# Patient Record
Sex: FEMALE | Race: WHITE | URBAN - METROPOLITAN AREA
[De-identification: names, ages, dates, MRNs, and addresses within clinical notes are randomized per-mention and may not be internally consistent; named-entity substitution may affect disease eponyms.]

---

## 2017-02-21 ENCOUNTER — ANESTHESIA EVENT (OUTPATIENT)
Dept: SURGERY | Facility: CLINIC | Age: 82
End: 2017-02-21
Payer: MEDICARE

## 2017-02-21 ENCOUNTER — HOSPITAL ENCOUNTER (OUTPATIENT)
Facility: CLINIC | Age: 82
Discharge: HOME | End: 2017-02-21
Attending: OTOLARYNGOLOGY | Admitting: OTOLARYNGOLOGY
Payer: MEDICARE

## 2017-02-21 ENCOUNTER — ANESTHESIA (OUTPATIENT)
Dept: SURGERY | Facility: CLINIC | Age: 82
End: 2017-02-21
Payer: MEDICARE

## 2017-02-21 VITALS
OXYGEN SATURATION: 94 % | HEIGHT: 55 IN | SYSTOLIC BLOOD PRESSURE: 147 MMHG | TEMPERATURE: 97.9 F | BODY MASS INDEX: 24.3 KG/M2 | RESPIRATION RATE: 12 BRPM | DIASTOLIC BLOOD PRESSURE: 92 MMHG | WEIGHT: 105 LBS

## 2017-02-21 DIAGNOSIS — G89.18 POST-OP PAIN: Primary | ICD-10-CM

## 2017-02-21 PROCEDURE — 25000132 ZZH RX MED GY IP 250 OP 250 PS 637: Mod: GY | Performed by: OTOLARYNGOLOGY

## 2017-02-21 PROCEDURE — 25000125 ZZHC RX 250: Performed by: NURSE ANESTHETIST, CERTIFIED REGISTERED

## 2017-02-21 PROCEDURE — 71000027 ZZH RECOVERY PHASE 2 EACH 15 MINS: Performed by: OTOLARYNGOLOGY

## 2017-02-21 PROCEDURE — 36000063 ZZH SURGERY LEVEL 4 EA 15 ADDTL MIN: Performed by: OTOLARYNGOLOGY

## 2017-02-21 PROCEDURE — A9270 NON-COVERED ITEM OR SERVICE: HCPCS | Mod: GY | Performed by: OTOLARYNGOLOGY

## 2017-02-21 PROCEDURE — 25800025 ZZH RX 258: Performed by: ANESTHESIOLOGY

## 2017-02-21 PROCEDURE — 27210794 ZZH OR GENERAL SUPPLY STERILE: Performed by: OTOLARYNGOLOGY

## 2017-02-21 PROCEDURE — 25000566 ZZH SEVOFLURANE, EA 15 MIN: Performed by: OTOLARYNGOLOGY

## 2017-02-21 PROCEDURE — 25000128 H RX IP 250 OP 636: Performed by: OTOLARYNGOLOGY

## 2017-02-21 PROCEDURE — 37000008 ZZH ANESTHESIA TECHNICAL FEE, 1ST 30 MIN: Performed by: OTOLARYNGOLOGY

## 2017-02-21 PROCEDURE — 25000128 H RX IP 250 OP 636: Performed by: NURSE ANESTHETIST, CERTIFIED REGISTERED

## 2017-02-21 PROCEDURE — 25000125 ZZHC RX 250: Performed by: OTOLARYNGOLOGY

## 2017-02-21 PROCEDURE — 36000093 ZZH SURGERY LEVEL 4 1ST 30 MIN: Performed by: OTOLARYNGOLOGY

## 2017-02-21 PROCEDURE — 37000009 ZZH ANESTHESIA TECHNICAL FEE, EACH ADDTL 15 MIN: Performed by: OTOLARYNGOLOGY

## 2017-02-21 PROCEDURE — 71000012 ZZH RECOVERY PHASE 1 LEVEL 1 FIRST HR: Performed by: OTOLARYNGOLOGY

## 2017-02-21 PROCEDURE — 27210995 ZZH RX 272: Performed by: OTOLARYNGOLOGY

## 2017-02-21 PROCEDURE — 40000306 ZZH STATISTIC PRE PROC ASSESS II: Performed by: OTOLARYNGOLOGY

## 2017-02-21 DEVICE — SHEET SILICONE 38MMX51MMX0.13MM  20-10685: Type: IMPLANTABLE DEVICE | Site: EAR | Status: FUNCTIONAL

## 2017-02-21 RX ORDER — EPINEPHRINE NASAL SOLUTION 1 MG/ML
SOLUTION NASAL PRN
Status: DISCONTINUED | OUTPATIENT
Start: 2017-02-21 | End: 2017-02-21 | Stop reason: HOSPADM

## 2017-02-21 RX ORDER — SODIUM CHLORIDE, SODIUM LACTATE, POTASSIUM CHLORIDE, CALCIUM CHLORIDE 600; 310; 30; 20 MG/100ML; MG/100ML; MG/100ML; MG/100ML
INJECTION, SOLUTION INTRAVENOUS CONTINUOUS
Status: DISCONTINUED | OUTPATIENT
Start: 2017-02-21 | End: 2017-02-21 | Stop reason: HOSPADM

## 2017-02-21 RX ORDER — GLYCOPYRROLATE 0.2 MG/ML
INJECTION, SOLUTION INTRAMUSCULAR; INTRAVENOUS PRN
Status: DISCONTINUED | OUTPATIENT
Start: 2017-02-21 | End: 2017-02-21

## 2017-02-21 RX ORDER — DEXAMETHASONE SODIUM PHOSPHATE 4 MG/ML
INJECTION, SOLUTION INTRA-ARTICULAR; INTRALESIONAL; INTRAMUSCULAR; INTRAVENOUS; SOFT TISSUE PRN
Status: DISCONTINUED | OUTPATIENT
Start: 2017-02-21 | End: 2017-02-21

## 2017-02-21 RX ORDER — CEFAZOLIN SODIUM 1 G/3ML
1 INJECTION, POWDER, FOR SOLUTION INTRAMUSCULAR; INTRAVENOUS SEE ADMIN INSTRUCTIONS
Status: DISCONTINUED | OUTPATIENT
Start: 2017-02-21 | End: 2017-02-21 | Stop reason: HOSPADM

## 2017-02-21 RX ORDER — ONDANSETRON 2 MG/ML
4 INJECTION INTRAMUSCULAR; INTRAVENOUS EVERY 30 MIN PRN
Status: DISCONTINUED | OUTPATIENT
Start: 2017-02-21 | End: 2017-02-21 | Stop reason: HOSPADM

## 2017-02-21 RX ORDER — MAGNESIUM HYDROXIDE 1200 MG/15ML
LIQUID ORAL PRN
Status: DISCONTINUED | OUTPATIENT
Start: 2017-02-21 | End: 2017-02-21 | Stop reason: HOSPADM

## 2017-02-21 RX ORDER — FENTANYL CITRATE 50 UG/ML
25-50 INJECTION, SOLUTION INTRAMUSCULAR; INTRAVENOUS
Status: DISCONTINUED | OUTPATIENT
Start: 2017-02-21 | End: 2017-02-21 | Stop reason: HOSPADM

## 2017-02-21 RX ORDER — HYDROCODONE BITARTRATE AND ACETAMINOPHEN 5; 325 MG/1; MG/1
1 TABLET ORAL ONCE
Status: COMPLETED | OUTPATIENT
Start: 2017-02-21 | End: 2017-02-21

## 2017-02-21 RX ORDER — ONDANSETRON 4 MG/1
4 TABLET, ORALLY DISINTEGRATING ORAL EVERY 30 MIN PRN
Status: DISCONTINUED | OUTPATIENT
Start: 2017-02-21 | End: 2017-02-21 | Stop reason: HOSPADM

## 2017-02-21 RX ORDER — CEFAZOLIN SODIUM 2 G/100ML
2 INJECTION, SOLUTION INTRAVENOUS
Status: COMPLETED | OUTPATIENT
Start: 2017-02-21 | End: 2017-02-21

## 2017-02-21 RX ORDER — LIDOCAINE 40 MG/G
CREAM TOPICAL
Status: DISCONTINUED | OUTPATIENT
Start: 2017-02-21 | End: 2017-02-21 | Stop reason: HOSPADM

## 2017-02-21 RX ORDER — DEXAMETHASONE SODIUM PHOSPHATE 10 MG/ML
8 INJECTION, SOLUTION INTRAMUSCULAR; INTRAVENOUS ONCE
Status: DISCONTINUED | OUTPATIENT
Start: 2017-02-21 | End: 2017-02-21 | Stop reason: HOSPADM

## 2017-02-21 RX ORDER — LABETALOL HYDROCHLORIDE 5 MG/ML
10 INJECTION, SOLUTION INTRAVENOUS
Status: DISCONTINUED | OUTPATIENT
Start: 2017-02-21 | End: 2017-02-21 | Stop reason: HOSPADM

## 2017-02-21 RX ORDER — FENTANYL CITRATE 50 UG/ML
INJECTION, SOLUTION INTRAMUSCULAR; INTRAVENOUS PRN
Status: DISCONTINUED | OUTPATIENT
Start: 2017-02-21 | End: 2017-02-21

## 2017-02-21 RX ORDER — HYDRALAZINE HYDROCHLORIDE 20 MG/ML
2.5-5 INJECTION INTRAMUSCULAR; INTRAVENOUS EVERY 10 MIN PRN
Status: DISCONTINUED | OUTPATIENT
Start: 2017-02-21 | End: 2017-02-21 | Stop reason: HOSPADM

## 2017-02-21 RX ORDER — ACETAMINOPHEN 325 MG/1
650 TABLET ORAL
Status: DISCONTINUED | OUTPATIENT
Start: 2017-02-21 | End: 2017-02-21 | Stop reason: HOSPADM

## 2017-02-21 RX ORDER — MUPIROCIN CALCIUM 20 MG/G
CREAM TOPICAL PRN
Status: DISCONTINUED | OUTPATIENT
Start: 2017-02-21 | End: 2017-02-21 | Stop reason: HOSPADM

## 2017-02-21 RX ORDER — PROPOFOL 10 MG/ML
INJECTION, EMULSION INTRAVENOUS PRN
Status: DISCONTINUED | OUTPATIENT
Start: 2017-02-21 | End: 2017-02-21

## 2017-02-21 RX ORDER — OFLOXACIN 3 MG/ML
SOLUTION AURICULAR (OTIC) PRN
Status: DISCONTINUED | OUTPATIENT
Start: 2017-02-21 | End: 2017-02-21 | Stop reason: HOSPADM

## 2017-02-21 RX ORDER — MEPERIDINE HYDROCHLORIDE 25 MG/ML
12.5 INJECTION INTRAMUSCULAR; INTRAVENOUS; SUBCUTANEOUS
Status: DISCONTINUED | OUTPATIENT
Start: 2017-02-21 | End: 2017-02-21 | Stop reason: HOSPADM

## 2017-02-21 RX ORDER — LIDOCAINE HYDROCHLORIDE 10 MG/ML
INJECTION, SOLUTION INFILTRATION; PERINEURAL PRN
Status: DISCONTINUED | OUTPATIENT
Start: 2017-02-21 | End: 2017-02-21

## 2017-02-21 RX ORDER — HYDROMORPHONE HYDROCHLORIDE 1 MG/ML
.3-.5 INJECTION, SOLUTION INTRAMUSCULAR; INTRAVENOUS; SUBCUTANEOUS EVERY 10 MIN PRN
Status: DISCONTINUED | OUTPATIENT
Start: 2017-02-21 | End: 2017-02-21 | Stop reason: HOSPADM

## 2017-02-21 RX ORDER — ONDANSETRON 2 MG/ML
INJECTION INTRAMUSCULAR; INTRAVENOUS PRN
Status: DISCONTINUED | OUTPATIENT
Start: 2017-02-21 | End: 2017-02-21

## 2017-02-21 RX ORDER — NALOXONE HYDROCHLORIDE 0.4 MG/ML
.1-.4 INJECTION, SOLUTION INTRAMUSCULAR; INTRAVENOUS; SUBCUTANEOUS
Status: DISCONTINUED | OUTPATIENT
Start: 2017-02-21 | End: 2017-02-21 | Stop reason: HOSPADM

## 2017-02-21 RX ORDER — HYDROCODONE BITARTRATE AND ACETAMINOPHEN 5; 325 MG/1; MG/1
1-2 TABLET ORAL
Status: DISCONTINUED | OUTPATIENT
Start: 2017-02-21 | End: 2017-02-21 | Stop reason: HOSPADM

## 2017-02-21 RX ORDER — HYDROCODONE BITARTRATE AND ACETAMINOPHEN 5; 325 MG/1; MG/1
1 TABLET ORAL EVERY 4 HOURS PRN
Qty: 20 TABLET | Refills: 0 | Status: SHIPPED | OUTPATIENT
Start: 2017-02-21

## 2017-02-21 RX ADMIN — PHENYLEPHRINE HYDROCHLORIDE 50 MCG: 10 INJECTION, SOLUTION INTRAMUSCULAR; INTRAVENOUS; SUBCUTANEOUS at 10:27

## 2017-02-21 RX ADMIN — PHENYLEPHRINE HYDROCHLORIDE 75 MCG: 10 INJECTION, SOLUTION INTRAMUSCULAR; INTRAVENOUS; SUBCUTANEOUS at 08:27

## 2017-02-21 RX ADMIN — FENTANYL CITRATE 50 MCG: 50 INJECTION, SOLUTION INTRAMUSCULAR; INTRAVENOUS at 08:50

## 2017-02-21 RX ADMIN — PHENYLEPHRINE HYDROCHLORIDE 50 MCG: 10 INJECTION, SOLUTION INTRAMUSCULAR; INTRAVENOUS; SUBCUTANEOUS at 09:04

## 2017-02-21 RX ADMIN — FENTANYL CITRATE 50 MCG: 50 INJECTION, SOLUTION INTRAMUSCULAR; INTRAVENOUS at 07:52

## 2017-02-21 RX ADMIN — CEFAZOLIN SODIUM 2 G: 2 INJECTION, SOLUTION INTRAVENOUS at 07:33

## 2017-02-21 RX ADMIN — PHENYLEPHRINE HYDROCHLORIDE 50 MCG: 10 INJECTION, SOLUTION INTRAMUSCULAR; INTRAVENOUS; SUBCUTANEOUS at 08:59

## 2017-02-21 RX ADMIN — SODIUM CHLORIDE, POTASSIUM CHLORIDE, SODIUM LACTATE AND CALCIUM CHLORIDE: 600; 310; 30; 20 INJECTION, SOLUTION INTRAVENOUS at 07:33

## 2017-02-21 RX ADMIN — PHENYLEPHRINE HYDROCHLORIDE 50 MCG: 10 INJECTION, SOLUTION INTRAMUSCULAR; INTRAVENOUS; SUBCUTANEOUS at 08:49

## 2017-02-21 RX ADMIN — PHENYLEPHRINE HYDROCHLORIDE 100 MCG: 10 INJECTION, SOLUTION INTRAMUSCULAR; INTRAVENOUS; SUBCUTANEOUS at 09:33

## 2017-02-21 RX ADMIN — PROPOFOL 100 MG: 10 INJECTION, EMULSION INTRAVENOUS at 07:41

## 2017-02-21 RX ADMIN — SODIUM CHLORIDE, POTASSIUM CHLORIDE, SODIUM LACTATE AND CALCIUM CHLORIDE: 600; 310; 30; 20 INJECTION, SOLUTION INTRAVENOUS at 09:37

## 2017-02-21 RX ADMIN — ONDANSETRON 4 MG: 2 INJECTION INTRAMUSCULAR; INTRAVENOUS at 09:30

## 2017-02-21 RX ADMIN — FENTANYL CITRATE 50 MCG: 50 INJECTION, SOLUTION INTRAMUSCULAR; INTRAVENOUS at 10:28

## 2017-02-21 RX ADMIN — PHENYLEPHRINE HYDROCHLORIDE 100 MCG: 10 INJECTION, SOLUTION INTRAMUSCULAR; INTRAVENOUS; SUBCUTANEOUS at 08:18

## 2017-02-21 RX ADMIN — GLYCOPYRROLATE 0.2 MG: 0.2 INJECTION, SOLUTION INTRAMUSCULAR; INTRAVENOUS at 07:41

## 2017-02-21 RX ADMIN — HYDROCODONE BITARTRATE AND ACETAMINOPHEN 1 TABLET: 5; 325 TABLET ORAL at 11:35

## 2017-02-21 RX ADMIN — PHENYLEPHRINE HYDROCHLORIDE 75 MCG: 10 INJECTION, SOLUTION INTRAMUSCULAR; INTRAVENOUS; SUBCUTANEOUS at 08:34

## 2017-02-21 RX ADMIN — FENTANYL CITRATE 50 MCG: 50 INJECTION, SOLUTION INTRAMUSCULAR; INTRAVENOUS at 07:41

## 2017-02-21 RX ADMIN — LIDOCAINE HYDROCHLORIDE 30 MG: 10 INJECTION, SOLUTION INFILTRATION; PERINEURAL at 07:41

## 2017-02-21 RX ADMIN — CEFAZOLIN 1 G: 1 INJECTION, POWDER, FOR SOLUTION INTRAMUSCULAR; INTRAVENOUS at 09:33

## 2017-02-21 RX ADMIN — DEXAMETHASONE SODIUM PHOSPHATE 4 MG: 4 INJECTION, SOLUTION INTRAMUSCULAR; INTRAVENOUS at 07:41

## 2017-02-21 RX ADMIN — PHENYLEPHRINE HYDROCHLORIDE 50 MCG: 10 INJECTION, SOLUTION INTRAMUSCULAR; INTRAVENOUS; SUBCUTANEOUS at 08:16

## 2017-02-21 RX ADMIN — ROCURONIUM BROMIDE 20 MG: 10 INJECTION INTRAVENOUS at 07:41

## 2017-02-21 ASSESSMENT — LIFESTYLE VARIABLES: TOBACCO_USE: 1

## 2017-02-21 ASSESSMENT — COPD QUESTIONNAIRES: COPD: 1

## 2017-02-21 NOTE — ANESTHESIA CARE TRANSFER NOTE
Patient: Pema Hill    Procedure(s):  Left Tympanoplasty with facial nerve monitoring, Ossicular reconstruction,  Right ear exam with debridement - Wound Class: II-Clean Contaminated   - Wound Class: II-Clean Contaminated   - Wound Class: I-Clean    Diagnosis: Tympanic Membrane Perfortion   Diagnosis Additional Information: No value filed.    Anesthesia Type:   General, ETT     Note:  Airway :Face Mask  Patient transferred to:PACU        Vitals: (Last set prior to Anesthesia Care Transfer)    CRNA VITALS  2/21/2017 1023 - 2/21/2017 1117      2/21/2017             NIBP: 93/52    Pulse: 78    NIBP Mean: 71    SpO2: 98 %    Resp Rate (observed): (!)  3                Electronically Signed By: HMEA Story CRNA  February 21, 2017  11:17 AM

## 2017-02-21 NOTE — DISCHARGE INSTRUCTIONS
EAR OPERATION DISCHARGE INSTRUCTIONS    Jorge Patino M.D.  Zhang Yates M.D.  David Ga M.D.  Venkata Law M.D.  Etelvina Bailey M.D.  Grover Stevens M.D.  [phone]    Ear surgery is very delicate work. To assure the best results possible, please follow these simple guidelines:  Precautions to be observed:  A. NO water in the operated ear.  1. You may wash hair 24 hours after surgery, but use an earplug.  2. To avoid water in your ear after the packing is removed, place cotton in the outer ear and Vaseline on the outer portion of the cotton.  3. NO swimming.  4. You will be notified when water precautions may be discontinued.    B. DO NOT  blow your nose for two weeks following surgery. Gentle sniffling is permitted after surgery; gentle blowing after two weeks.    C. If you must sneeze, do so with your mouth open.    D. Restrict activity.  1. NO heavy lifting, bending or strenuous activity until after the packing is removed.  2. NO contact sports for 6 weeks.  3. You may engage in light activity (walking, desk work, etc.) whenever you feel like it.   4. You may drive a car if you experience NO dizziness. Do not drive if you are taking narcotic pain medication.  5. DO NOT fly for 2 weeks.  6. If you have had a stapes operation, please avoid the following:  a. Scuba diving  b. High diving  c. Surfing  d. Wrestling and/or boxing  e. Hard nose blowing (gentle blowing permitted)        GENERAL ANESTHESIA OR SEDATION ADULT DISCHARGE INSTRUCTIONS   SPECIAL PRECAUTIONS FOR 24 HOURS AFTER SURGERY    IT IS NOT UNUSUAL TO FEEL LIGHT-HEADED OR FAINT, UP TO 24 HOURS AFTER SURGERY OR WHILE TAKING PAIN MEDICATION.  IF YOU HAVE THESE SYMPTOMS; SIT FOR A FEW MINUTES BEFORE STANDING AND HAVE SOMEONE ASSIST YOU WHEN YOU GET UP TO WALK OR USE THE BATHROOM.    YOU SHOULD REST AND RELAX FOR THE NEXT 24 HOURS AND YOU MUST MAKE ARRANGEMENTS TO HAVE SOMEONE STAY WITH YOU FOR AT LEAST 24 HOURS AFTER YOUR DISCHARGE.  AVOID HAZARDOUS  AND STRENUOUS ACTIVITIES.  DO NOT MAKE IMPORTANT DECISIONS FOR 24 HOURS.    DO NOT DRIVE ANY VEHICLE OR OPERATE MECHANICAL EQUIPMENT FOR 24 HOURS FOLLOWING THE END OF YOUR SURGERY.  EVEN THOUGH YOU MAY FEEL NORMAL, YOUR REACTIONS MAY BE AFFECTED BY THE MEDICATION YOU HAVE RECEIVED.    DO NOT DRINK ALCOHOLIC BEVERAGES FOR 24 HOURS FOLLOWING YOUR SURGERY.    DRINK CLEAR LIQUIDS (APPLE JUICE, GINGER ALE, 7-UP, BROTH, ETC.).  PROGRESS TO YOUR REGULAR DIET AS YOU FEEL ABLE.    YOU MAY HAVE A DRY MOUTH, A SORE THROAT, MUSCLES ACHES OR TROUBLE SLEEPING.  THESE SHOULD GO AWAY AFTER 24 HOURS.    CALL YOUR DOCTOR FOR ANY OF THE FOLLOWING:  SIGNS OF INFECTION (FEVER, GROWING TENDERNESS AT THE SURGERY SITE, A LARGE AMOUNT OF DRAINAGE OR BLEEDING, SEVERE PAIN, FOUL-SMELLING DRAINAGE, REDNESS OR SWELLING.    IT HAS BEEN OVER 8 TO 10 HOURS SINCE SURGERY AND YOU ARE STILL NOT ABLE TO URINATE (PASS WATER).

## 2017-02-21 NOTE — ANESTHESIA POSTPROCEDURE EVALUATION
Patient: Pema Hill    Procedure(s):  Left Tympanoplasty with facial nerve monitoring, Ossicular reconstruction,  Right ear exam with debridement - Wound Class: II-Clean Contaminated   - Wound Class: II-Clean Contaminated   - Wound Class: I-Clean    Diagnosis:Tympanic Membrane Perfortion   Diagnosis Additional Information: [facility] Brief Operative Note     Pre-operative diagnosis: Tympanic Membrane Perforation   Post-operative diagnosis Tympanic Membrane Perforation, Ossicular discontinuity  Procedure: Procedure(s):  Left Tympanoplasty with facial nerve monito, ring, Ossicular reconstruction, Right ear exam with debridement - Wound Class: II-Clean Contaminated  - Wound Class: II-Clean Contaminated  - Wound Class: I-Clean  Surgeon(s): Surgeon(s) and Role:  * Jorge Patino MD - Primary  Estimated blood l, oss: 10         Anesthesia Type:  General, ETT    Note:  Anesthesia Post Evaluation    Patient location during evaluation: PACU  Patient participation: Able to fully participate in evaluation  Level of consciousness: awake  Pain management: adequate  Airway patency: patent  Cardiovascular status: acceptable  Respiratory status: acceptable  Hydration status: acceptable  PONV: none     Anesthetic complications: None          Last vitals:  Vitals:    02/21/17 1107 02/21/17 1115 02/21/17 1130   BP:  (!) 104/92 107/63   Resp: 13 11 10   Temp:   97.5  F (36.4  C)   SpO2: 97% 96% 92%         Electronically Signed By: Pravin Cui MD  February 21, 2017  12:00 PM

## 2017-02-21 NOTE — IP AVS SNAPSHOT
MRN:[id number]                      After Visit Summary   2/21/2017    Pema Hill    MRN: [id number]           Thank you!     Thank you for choosing [facility] for your care. Our goal is always to provide you with excellent care. Hearing back from our patients is one way we can continue to improve our services. Please take a few minutes to complete the written survey that you may receive in the mail after you visit. If you would like to speak to someone directly about your visit please contact Patient Relations at [phone]. Thank you!          Patient Information     Date Of Birth          12/31/1934        About your hospital stay     You were admitted on:  February 21, 2017 You last received care in the:  [facility] PreOP/PostOP    You were discharged on:  February 21, 2017       Who to Call     For medical emergencies, please call 911.  For non-urgent questions about your medical care, please call your primary care provider or clinic, [phone]  For questions related to your surgery, please call your surgery clinic        Attending Provider     Provider Specialty    Jorge Patino MD Otolaryngology       Primary Care Provider Office Phone # Fax #    Ailyn Saul [phone]       [facility] [address]  [facility] [address]        After Care Instructions     Discharge Instructions       Patient to follow up with surgeon in one week.            No driving or operating machinery       until the day after procedure and off all narcotic pain medication            Notify Physician       If excessive bleeding                  Further instructions from your care team       EAR OPERATION DISCHARGE INSTRUCTIONS    Jorge Patino M.D.  Zhang Yates M.D.  David Ga M.D.  Venkata Law M.D.  Etelvina Bailey M.D.  Grover Stevens M.D.  [phone]    Ear surgery is very delicate work. To assure the best results possible, please follow these  simple guidelines:  Precautions to be observed:  A. NO water in the operated ear.  1. You may wash hair 24 hours after surgery, but use an earplug.  2. To avoid water in your ear after the packing is removed, place cotton in the outer ear and Vaseline on the outer portion of the cotton.  3. NO swimming.  4. You will be notified when water precautions may be discontinued.    B. DO NOT  blow your nose for two weeks following surgery. Gentle sniffling is permitted after surgery; gentle blowing after two weeks.    C. If you must sneeze, do so with your mouth open.    D. Restrict activity.  1. NO heavy lifting, bending or strenuous activity until after the packing is removed.  2. NO contact sports for 6 weeks.  3. You may engage in light activity (walking, desk work, etc.) whenever you feel like it.   4. You may drive a car if you experience NO dizziness. Do not drive if you are taking narcotic pain medication.  5. DO NOT fly for 2 weeks.  6. If you have had a stapes operation, please avoid the following:  a. Scuba diving  b. High diving  c. Surfing  d. Wrestling and/or boxing  e. Hard nose blowing (gentle blowing permitted)        GENERAL ANESTHESIA OR SEDATION ADULT DISCHARGE INSTRUCTIONS   SPECIAL PRECAUTIONS FOR 24 HOURS AFTER SURGERY    IT IS NOT UNUSUAL TO FEEL LIGHT-HEADED OR FAINT, UP TO 24 HOURS AFTER SURGERY OR WHILE TAKING PAIN MEDICATION.  IF YOU HAVE THESE SYMPTOMS; SIT FOR A FEW MINUTES BEFORE STANDING AND HAVE SOMEONE ASSIST YOU WHEN YOU GET UP TO WALK OR USE THE BATHROOM.    YOU SHOULD REST AND RELAX FOR THE NEXT 24 HOURS AND YOU MUST MAKE ARRANGEMENTS TO HAVE SOMEONE STAY WITH YOU FOR AT LEAST 24 HOURS AFTER YOUR DISCHARGE.  AVOID HAZARDOUS AND STRENUOUS ACTIVITIES.  DO NOT MAKE IMPORTANT DECISIONS FOR 24 HOURS.    DO NOT DRIVE ANY VEHICLE OR OPERATE MECHANICAL EQUIPMENT FOR 24 HOURS FOLLOWING THE END OF YOUR SURGERY.  EVEN THOUGH YOU MAY FEEL NORMAL, YOUR REACTIONS MAY BE AFFECTED BY THE MEDICATION YOU  "HAVE RECEIVED.    DO NOT DRINK ALCOHOLIC BEVERAGES FOR 24 HOURS FOLLOWING YOUR SURGERY.    DRINK CLEAR LIQUIDS (APPLE JUICE, GINGER ALE, 7-UP, BROTH, ETC.).  PROGRESS TO YOUR REGULAR DIET AS YOU FEEL ABLE.    YOU MAY HAVE A DRY MOUTH, A SORE THROAT, MUSCLES ACHES OR TROUBLE SLEEPING.  THESE SHOULD GO AWAY AFTER 24 HOURS.    CALL YOUR DOCTOR FOR ANY OF THE FOLLOWING:  SIGNS OF INFECTION (FEVER, GROWING TENDERNESS AT THE SURGERY SITE, A LARGE AMOUNT OF DRAINAGE OR BLEEDING, SEVERE PAIN, FOUL-SMELLING DRAINAGE, REDNESS OR SWELLING.    IT HAS BEEN OVER 8 TO 10 HOURS SINCE SURGERY AND YOU ARE STILL NOT ABLE TO URINATE (PASS WATER).       Pending Results     No orders found from 2017 to 2017.            Admission Information     Date & Time Provider Department Dept. Phone    2017 Jorge Patino MD [facility] PreOP/PostOP [phone]      Your Vitals Were     Blood Pressure Temperature Respirations Height Weight Pulse Oximetry    107/63 97.5  F (36.4  C) (Temporal) 10 1.372 m (4' 6\") 47.6 kg (105 lb) 92%    BMI (Body Mass Index)                   25.32 kg/m2           MyChart Information     [employer] lets you send messages to your doctor, view your test results, renew your prescriptions, schedule appointments and more. To sign up, go to [email or web address] . Click on \"Log in\" on the left side of the screen, which will take you to the Welcome page. Then click on \"Sign up Now\" on the right side of the page.     You will be asked to enter the access code listed below, as well as some personal information. Please follow the directions to create your username and password.     Your access code is: [email or web address]  Expires: 2017 11:18 AM     Your access code will  in 90 days. If you need help or a new code, please call your [facility] clinic or [phone].        Care EveryWhere ID     This is your Care EveryWhere ID. This could be used by other organizations to access your [facility] medical " records  [id number]           Review of your medicines      UNREVIEWED medicines. Ask your doctor about these medicines        Dose / Directions    Albuterol Sulfate 108 (90 BASE) MCG/ACT Aepb        2 puffs 4 times daily as needed   Refills:  0       amoxicillin-clavulanate 875-125 MG per tablet   Commonly known as:  AUGMENTIN        Dose:  1 tablet   Take 1 tablet by mouth 2 times daily   Refills:  0       ASPIRIN EC PO        Dose:  81 mg   Take 81 mg by mouth   Refills:  0       LISINOPRIL PO        Dose:  10 mg   Take 10 mg by mouth daily   Refills:  0       NITROSTAT SL        Dose:  0.4 mg   Place 0.4 mg under the tongue   Refills:  0       SIMVASTATIN PO        Dose:  40 mg   Take 40 mg by mouth At Bedtime   Refills:  0       tiotropium 18 MCG capsule   Commonly known as:  SPIRIVA        Dose:  18 mcg   Inhale 18 mcg into the lungs daily   Refills:  0         START taking        Dose / Directions    HYDROcodone-acetaminophen 5-325 MG per tablet   Commonly known as:  NORCO   Used for:  Post-op pain        Dose:  1 tablet   Take 1 tablet by mouth every 4 hours as needed for other (Moderate to Severe Pain)   Quantity:  20 tablet   Refills:  0            Where to get your medicines      Some of these will need a paper prescription and others can be bought over the counter. Ask your nurse if you have questions.     Bring a paper prescription for each of these medications     HYDROcodone-acetaminophen 5-325 MG per tablet                Protect others around you: Learn how to safely use, store and throw away your medicines at www.disposemymeds.org.             Medication List: This is a list of all your medications and when to take them. Check marks below indicate your daily home schedule. Keep this list as a reference.      Medications           Morning Afternoon Evening Bedtime As Needed    Albuterol Sulfate 108 (90 BASE) MCG/ACT Aepb   2 puffs 4 times daily as needed                                 amoxicillin-clavulanate 875-125 MG per tablet   Commonly known as:  AUGMENTIN   Take 1 tablet by mouth 2 times daily                                ASPIRIN EC PO   Take 81 mg by mouth                                HYDROcodone-acetaminophen 5-325 MG per tablet   Commonly known as:  NORCO   Take 1 tablet by mouth every 4 hours as needed for other (Moderate to Severe Pain)   Last time this was given:  1 tablet on 2/21/2017 11:35 AM                                LISINOPRIL PO   Take 10 mg by mouth daily                                NITROSTAT SL   Place 0.4 mg under the tongue                                SIMVASTATIN PO   Take 40 mg by mouth At Bedtime                                tiotropium 18 MCG capsule   Commonly known as:  SPIRIVA   Inhale 18 mcg into the lungs daily

## 2017-02-21 NOTE — BRIEF OP NOTE
[facility] Brief Operative Note    Pre-operative diagnosis: Tympanic Membrane Perforation    Post-operative diagnosis Tympanic Membrane Perforation,  Ossicular discontinuity   Procedure: Procedure(s):  Left Tympanoplasty with facial nerve monitoring, Ossicular reconstruction,  Right ear exam with debridement - Wound Class: II-Clean Contaminated   - Wound Class: II-Clean Contaminated   - Wound Class: I-Clean   Surgeon(s): Surgeon(s) and Role:     * Jorge Patino MD - Primary   Estimated blood loss: 10 mL    Specimens: No specimens in log    Findings:

## 2017-02-21 NOTE — ANESTHESIA PREPROCEDURE EVALUATION
Anesthesia Evaluation     . Pt has had prior anesthetic.     No history of anesthetic complications     ROS/MED HX    ENT/Pulmonary:     (+)tobacco use, Past use COPD, , . .    Neurologic:       Cardiovascular:     (+) Dyslipidemia, hypertension--CAD, --. : . . . :. .       METS/Exercise Tolerance:     Hematologic:         Musculoskeletal:         GI/Hepatic:         Renal/Genitourinary:         Endo:         Psychiatric:         Infectious Disease:         Malignancy:         Other:               Physical Exam  Normal systems: cardiovascular and pulmonary    Airway   Mallampati: II  TM distance: >3 FB  Neck ROM: full    Dental   (+) upper dentures and lower dentures    Cardiovascular       Pulmonary                     Anesthesia Plan      History & Physical Review  History and physical reviewed and following examination; no interval change.    ASA Status:  3 .    NPO Status:  > 8 hours    Plan for General and ETT with Intravenous and Propofol induction. Maintenance will be Balanced.    PONV prophylaxis:  Ondansetron (or other 5HT-3) and Dexamethasone or Solumedrol       Postoperative Care  Postoperative pain management:  IV analgesics.      Consents  Anesthetic plan, risks, benefits and alternatives discussed with:  Patient.  Use of blood products discussed: Yes.   .                          .

## 2017-02-21 NOTE — IP AVS SNAPSHOT
[facility] PreOP/PostOP    [address]    [facility] [address]    Phone:  [phone]    Fax:  [phone]                                       After Visit Summary   2/21/2017    Pema Hill    MRN: [id number]           After Visit Summary Signature Page     I have received my discharge instructions, and my questions have been answered. I have discussed any challenges I see with this plan with the nurse or doctor.    ..........................................................................................................................................  Patient/Patient Representative Signature      ..........................................................................................................................................  Patient Representative Print Name and Relationship to Patient    ..................................................               ................................................  Date                                            Time    ..........................................................................................................................................  Reviewed by Signature/Title    ...................................................              ..............................................  Date                                                            Time

## 2017-02-22 NOTE — OP NOTE
DATE OF PROCEDURE:        PREOPERATIVE DIAGNOSES:     1.  Left tympanic membrane perforation.   2.  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      POSTOPERATIVE DIAGNOSES:   1.  Left tympanic membrane perforation.   2.  Ossicular discontinuity.     3.  Chronic external otitis of the right ear.        PROCEDURE:   1.  Left type 2 incus transposition tympanoplasty.   2.  Facial nerve monitoring 3 hours.   3.  Debridement of right external auditory canal.        INDICATIONS:  Pema Hill has a history of recurring infections in her left and right ears and the chronic perforation in the left tympanic membrane was felt to be a significant cause of recurring infections.  She also has a significant conductive component to her hearing loss on the left.  She has a history of prior surgery.  The possible risks, benefits, alternatives, probabilities of success of the procedure including the risks of continued perforation, bleeding, hearing loss, dizziness, tinnitus and risks associated with her age and pulmonary status and risks to the facial nerve were discussed and consent is obtained.        OPERATIVE FINDINGS:  The left tympanic membrane was markedly retracted and there was a perforation in the superior tympanic membrane.  It is difficult to tell whether it is anterior or posterior because the malleus is either missing or eroded so is not there to serve as a landmark.  There is scarring that is holding the tympanic membrane to the medial wall of the middle ear.  There was a layer of thick dead skin over the tympanic membrane.  The middle ear has significant synechiae and scar tissue encasing the stapes and incus remnant.  The long process of the incus is eroded and no longer attached to the capitulum of the stapes.  The body of the incus does not appear to be attached to the malleus which appears to be mostly missing.  The stapes is mobile but slanted toward the promontory.  There is a contracted round  window niche, but it was possible to see a round window reflex when touching the stapes.  There is no evidence of cholesteatoma.  There is a piece of Silastic in the anterior middle ear that was removed and some clear mucoid fluid in various places in the middle ear.  The middle ear mucosa is not thickened other than the areas where the adhesions are prominent.      The right ear canal has dense and thick layer of epithelium along the inferior aspect up to but not including the tympanic membrane.      DESCRIPTION OF PROCEDURE:  Under general endotracheal anesthetic, the left ear was prepped and draped after injecting 2 mL of 1% Xylocaine with 1:50,000 epinephrine in the canal and scalp area in the usual fashion for hemostasis.  Prior to prepping and draping, the facial nerve monitoring electrodes were placed in the frontalis and pressor muscle groups of the left side of the face and function was verified.  The microscope was used to examine the external auditory canal and a 6.5 mm speculum was placed.  The perforation was debrided so that fresh edges were obtained all around the circumference of the perforation.  A standard tympanomeatal flap was elevated and upon entering the middle ear, it was noted that no chorda tympani was visible.  The tympanic membrane was carefully elevated off the stapes and a tiny piece of bone which may have represented the end of the long process of the incus was debrided from the tympanic membrane.  The stapedius tendon was intact.  After all adhesions had been lysed and the tympanic membrane could be lateralized the tensor tympani was incised to allow the tympanic membrane to lateralize further.  The facial nerve canal appeared to be intact.  No topical adrenaline was placed in the ear to encourage as much good blood supply as possible to those tissues and attention was turned to the scalp above the ear to obtain a fascia graft.  An incision was made and carried down to the temporalis  fascia, which was thin posteriorly so the incision had to be extended anteriorly where there was a vein that was divided and ligated with 4-0 silk.  The proximal segment was double stick tie ligated with 4-0 silk.  The fascia was harvested and placed in saline.  The incus which had been removed was grasped with an ossicle garcia and the Konstantin drill was used to drill a concavity in the body of the incus.  The body was shaped appropriately so that it would fit on the capitulum of the stapes.  A piece of thin Silastic was placed over the promontory and most of the medial wall of the middle ear extending somewhat up between the stapes superstructure and the promontory.  The body of the incus was placed over the capitulum of the stapes with the short process extending anteriorly over the promontory, but not touching it because of the Silastic between the two structures.  Another small piece of Silastic sheeting was placed between the body of the incus and the scutum posteriorly even though the two structures were not touching.  Touching the transposed incus appeared to produce a round window reflex.  The position of this arrangement was stabilized by pieces of pressed Gelfoam.  The fascia graft which was only pressed for a few seconds to help ensure its viability was now placed medial to the tympanic membrane and lateral to the transposed incus.  The tympanomeatal flap was replaced in its original position and the position of the graft checked and it did appear to be covering the edges of the entire perforation.  The external canal was packed, first with pieces of pressed Gelfoam and then laterally larger pieces of Gelfoam that had been treated with ofloxacin solution.  The lateral canal was packed with half inch Vaseline gauze impregnated with mupirocin ointment.  It should be noted that the scalp incision had previously been closed using interrupted 3-0 chromic and Dermabond on the skin.  A sterile ear pad was placed  over the left ear and attention was turned to the right ear canal where the densely adherent skin was gently lifted from fresh skin underneath it.  The Bellucci scissors was used to trim this dead material from the more viable skin in the canal.  There was a miniscule amount of bleeding when doing that.  It was done carefully so as not to disrupt the skin of the tympanic membrane to which it was attached.  The canal was irrigated with Betadine and then ofloxacin was placed in the canal.  The procedure was completed and the patient returned to the [facility] in good condition having tolerated the procedure well.  There were no apparent complications and blood loss was approximately 10 mL, mainly from the scalp incision.  There were no alarms from the facial nerve monitoring system.         MARY BETH SUAREZ MD             D: 2017 11:29   T: 2017 18:03   MT: [id number]      Name:     LUKASZ MONTGOMERY   MRN:      [id number]        Account:        [id number]   :      1934           Procedure Date: 2017      Document: [id number]       cc: Ailyn Saul MD

## (undated) DEVICE — GLOVE PROTEXIS BLUE W/NEU-THERA 7.0  2D73EB70

## (undated) DEVICE — ESU PENCIL W/HOLSTER E2350H

## (undated) DEVICE — PREP POVIDONE IODINE SOLUTION 10% 120ML

## (undated) DEVICE — ESU GROUND PAD ADULT W/CORD E7507

## (undated) DEVICE — GOWN XLG DISP 9545

## (undated) DEVICE — TUBING IV EXTENSION SET ANESTHESIA 34" MLL 2C6227

## (undated) DEVICE — SUCTION CANISTER MEDIVAC LINER 3000ML W/LID 65651-530

## (undated) DEVICE — MAGSTIM ELECTRODE NEUROSIGN 100 FACIAL NERVE

## (undated) DEVICE — DRAPE MICRO ZEISS OPMI 137X381CM 306070-0000-000

## (undated) DEVICE — BAG CLEAR TRASH 1.3M 39X33" P4040C

## (undated) DEVICE — SU CHROMIC 4-0 PS-2 18" 1637G

## (undated) DEVICE — SU CHROMIC 3-0 PS-2 27" 1638H

## (undated) DEVICE — SU DERMABOND MINI DHVM12

## (undated) DEVICE — LINEN HALF SHEET 5512

## (undated) DEVICE — BLADE CLIPPER 3M 9670

## (undated) DEVICE — LINEN DRAPE 54X72" 5467

## (undated) DEVICE — ESU ELEC NDL 1" COATED/INSULATED E1465

## (undated) DEVICE — PACK ENT PLASTICS RIDGES

## (undated) DEVICE — DRSG DERMACARE (OWENS SILK) 3X8" 8886834100

## (undated) DEVICE — LINEN FULL SHEET 5511

## (undated) DEVICE — SU SILK 4-0 G-3DA 18" 783G

## (undated) DEVICE — GELFOAM 7X12MM

## (undated) DEVICE — SOL WATER IRRIG 1000ML BOTTLE 2F7114

## (undated) DEVICE — SU SILK 4-0 TIE 18" SA63H

## (undated) DEVICE — LINEN TOWEL PACK X10 5473

## (undated) DEVICE — SYR 10ML SLIP TIP W/O NDL

## (undated) DEVICE — SUCTION CANISTER STRAW 65652-008

## (undated) DEVICE — SOL NACL 0.9% IRRIG 1000ML BOTTLE 2F7124

## (undated) DEVICE — SYR 03ML LL W/O NDL 309657

## (undated) DEVICE — SYR 20ML LL W/O NDL 302830

## (undated) DEVICE — NDL BLUNT 18GA 1.5" FILTER 305211

## (undated) DEVICE — Device

## (undated) DEVICE — CATH TRAY FOLEY 16FR DRAINAGE BAG STATLOCK 899916

## (undated) DEVICE — DRSG VASELINE 1/2X72" 8884421600

## (undated) DEVICE — SYR 01ML 25GA 5/8" TBC

## (undated) DEVICE — GLOVE PROTEXIS W/NEU-THERA 7.5  2D73TE75

## (undated) DEVICE — NDL 27GA 1.25" 305136

## (undated) RX ORDER — CEFAZOLIN SODIUM 1 G/3ML
INJECTION, POWDER, FOR SOLUTION INTRAMUSCULAR; INTRAVENOUS
Status: DISPENSED
Start: 2017-02-21

## (undated) RX ORDER — LIDOCAINE HYDROCHLORIDE 10 MG/ML
INJECTION, SOLUTION INFILTRATION; PERINEURAL
Status: DISPENSED
Start: 2017-02-21

## (undated) RX ORDER — CEFAZOLIN SODIUM 2 G/100ML
INJECTION, SOLUTION INTRAVENOUS
Status: DISPENSED
Start: 2017-02-21

## (undated) RX ORDER — ONDANSETRON 2 MG/ML
INJECTION INTRAMUSCULAR; INTRAVENOUS
Status: DISPENSED
Start: 2017-02-21

## (undated) RX ORDER — FENTANYL CITRATE 50 UG/ML
INJECTION, SOLUTION INTRAMUSCULAR; INTRAVENOUS
Status: DISPENSED
Start: 2017-02-21

## (undated) RX ORDER — OFLOXACIN 3 MG/ML
SOLUTION/ DROPS OPHTHALMIC
Status: DISPENSED
Start: 2017-02-21

## (undated) RX ORDER — GLYCOPYRROLATE 0.2 MG/ML
INJECTION INTRAMUSCULAR; INTRAVENOUS
Status: DISPENSED
Start: 2017-02-21

## (undated) RX ORDER — HYDROCODONE BITARTRATE AND ACETAMINOPHEN 5; 325 MG/1; MG/1
TABLET ORAL
Status: DISPENSED
Start: 2017-02-21

## (undated) RX ORDER — LIDOCAINE HYDROCHLORIDE 10 MG/ML
INJECTION, SOLUTION EPIDURAL; INFILTRATION; INTRACAUDAL; PERINEURAL
Status: DISPENSED
Start: 2017-02-21

## (undated) RX ORDER — MUPIROCIN 20 MG/G
OINTMENT TOPICAL
Status: DISPENSED
Start: 2017-02-21

## (undated) RX ORDER — GINSENG 100 MG
CAPSULE ORAL
Status: DISPENSED
Start: 2017-02-21

## (undated) RX ORDER — EPINEPHRINE NASAL SOLUTION 1 MG/ML
SOLUTION NASAL
Status: DISPENSED
Start: 2017-02-21

## (undated) RX ORDER — PROPOFOL 10 MG/ML
INJECTION, EMULSION INTRAVENOUS
Status: DISPENSED
Start: 2017-02-21

## (undated) RX ORDER — DEXAMETHASONE SODIUM PHOSPHATE 4 MG/ML
INJECTION, SOLUTION INTRA-ARTICULAR; INTRALESIONAL; INTRAMUSCULAR; INTRAVENOUS; SOFT TISSUE
Status: DISPENSED
Start: 2017-02-21